# Patient Record
Sex: FEMALE | Race: BLACK OR AFRICAN AMERICAN | Employment: PART TIME | URBAN - METROPOLITAN AREA
[De-identification: names, ages, dates, MRNs, and addresses within clinical notes are randomized per-mention and may not be internally consistent; named-entity substitution may affect disease eponyms.]

---

## 2024-06-24 ENCOUNTER — HOSPITAL ENCOUNTER (EMERGENCY)
Age: 39
Discharge: HOME | End: 2024-06-24
Attending: EMERGENCY MEDICINE
Payer: COMMERCIAL

## 2024-06-24 VITALS
RESPIRATION RATE: 18 BRPM | HEIGHT: 62 IN | HEART RATE: 70 BPM | BODY MASS INDEX: 28.54 KG/M2 | OXYGEN SATURATION: 99 % | WEIGHT: 155.1 LBS | TEMPERATURE: 97.8 F | SYSTOLIC BLOOD PRESSURE: 121 MMHG | DIASTOLIC BLOOD PRESSURE: 78 MMHG

## 2024-06-24 DIAGNOSIS — L30.9 DERMATITIS: Primary | ICD-10-CM

## 2024-06-24 PROCEDURE — 99283 EMERGENCY DEPT VISIT LOW MDM: CPT

## 2024-06-24 PROCEDURE — 6370000000 HC RX 637 (ALT 250 FOR IP): Performed by: EMERGENCY MEDICINE

## 2024-06-24 RX ORDER — DIPHENHYDRAMINE HCL 25 MG
25 TABLET ORAL ONCE
Status: COMPLETED | OUTPATIENT
Start: 2024-06-24 | End: 2024-06-24

## 2024-06-24 RX ORDER — PREDNISONE 20 MG/1
60 TABLET ORAL ONCE
Status: COMPLETED | OUTPATIENT
Start: 2024-06-24 | End: 2024-06-24

## 2024-06-24 RX ORDER — FAMOTIDINE 20 MG/1
20 TABLET, FILM COATED ORAL 2 TIMES DAILY
Qty: 20 TABLET | Refills: 0 | Status: SHIPPED | OUTPATIENT
Start: 2024-06-24 | End: 2024-07-04

## 2024-06-24 RX ORDER — FAMOTIDINE 20 MG/1
20 TABLET, FILM COATED ORAL ONCE
Status: COMPLETED | OUTPATIENT
Start: 2024-06-24 | End: 2024-06-24

## 2024-06-24 RX ORDER — PREDNISONE 20 MG/1
40 TABLET ORAL DAILY
Qty: 8 TABLET | Refills: 0 | Status: SHIPPED | OUTPATIENT
Start: 2024-06-24 | End: 2024-06-28

## 2024-06-24 RX ADMIN — PREDNISONE 60 MG: 20 TABLET ORAL at 02:50

## 2024-06-24 RX ADMIN — FAMOTIDINE 20 MG: 20 TABLET ORAL at 02:50

## 2024-06-24 RX ADMIN — DIPHENHYDRAMINE HYDROCHLORIDE 25 MG: 25 TABLET ORAL at 02:50

## 2024-06-24 ASSESSMENT — PAIN DESCRIPTION - LOCATION: LOCATION: FACE

## 2024-06-24 ASSESSMENT — PAIN DESCRIPTION - DESCRIPTORS: DESCRIPTORS: BURNING

## 2024-06-24 ASSESSMENT — PAIN - FUNCTIONAL ASSESSMENT: PAIN_FUNCTIONAL_ASSESSMENT: 0-10

## 2024-06-24 ASSESSMENT — PAIN SCALES - GENERAL: PAINLEVEL_OUTOF10: 10

## 2024-06-24 NOTE — ED PROVIDER NOTES
rhythm.    Lungs:  Clear to auscultation.  No wheezes, rales, or ronchi. No conversational dyspnea or accessory muscle use.  Chest: Chest wall non-tender.  No evidence of trauma.  Abdomen:  Soft, nondistended, bowel sounds present.  Nontender.  No guarding rigidity or rebound. No masses.   Musculoskeletal: Extremities non-tender with full range of motion.  Radial and dorsalis pedis pulses were intact.  No calf tenderness erythema or edema.  Neurological: Alert and oriented x 3.  Speech clear. No facial droop.  No acute focal motor or sensory deficits.  Skin: Skin is warm and dry.  There is a erythematous cobblestone like appearance to both sides of the face that is symmetrical.  No vesicles.  No periorbital involvement.  No involvement of the nose or mouth.  Lymphatic:  No lympadenopathy.    Psychiatric: Normal mood and affect. Behavior is normal.         DIAGNOSTIC RESULTS     EKG: All EKG's are interpreted by me, the Emergency Department Physician, who either signs or Co-signs this chart in the absence of a cardiologist.        RADIOLOGY:   Non-plain film images such as CT, Ultrasound and MRI are read by the radiologist. Plain radiographic images are visualized and preliminarily interpreted by the emergency physician with the below findings:        Interpretation per the Radiologist below, if available at the time of this note:    No orders to display         ED BEDSIDE ULTRASOUND:   Performed by ED Physician - none    LABS:  Labs Reviewed - No data to display    All other labs were within normal range or not returned as of this dictation.    EMERGENCY DEPARTMENT COURSE and DIFFERENTIAL DIAGNOSIS/ MEDICAL DECISION MAKING:   Vitals:    Vitals:    06/24/24 0231   BP: 121/78   Pulse: 70   Resp: 18   Temp: 97.8 °F (36.6 °C)   TempSrc: Oral   SpO2: 99%   Weight: 70.4 kg (155 lb 1.6 oz)   Height: 1.575 m (5' 2\")         MDM      The patient presents with a very pruritic rash to both sides of the face.  Rash is

## 2024-06-24 NOTE — DISCHARGE INSTRUCTIONS
Do not scratch.  Recommend cool showers.  Avoid hot showers.    May take Benadryl as directed for any itching.  Do not drive or operate machinery while taking Benadryl.  May cause drowsiness.     Drink plenty of fluids.  Follow-up with a primary care physician in 1 to 2 days for reexamination.  Call today for an appointment.  If condition worsens or new symptoms develop, return immediately to the emergency department.